# Patient Record
Sex: FEMALE | URBAN - METROPOLITAN AREA
[De-identification: names, ages, dates, MRNs, and addresses within clinical notes are randomized per-mention and may not be internally consistent; named-entity substitution may affect disease eponyms.]

---

## 2021-10-05 ENCOUNTER — APPOINTMENT (RX ONLY)
Dept: URBAN - METROPOLITAN AREA CLINIC 55 | Facility: CLINIC | Age: 61
Setting detail: DERMATOLOGY
End: 2021-10-05

## 2021-10-05 DIAGNOSIS — Z41.9 ENCOUNTER FOR PROCEDURE FOR PURPOSES OTHER THAN REMEDYING HEALTH STATE, UNSPECIFIED: ICD-10-CM

## 2021-10-05 PROCEDURE — ? DYSPORT

## 2021-10-05 NOTE — PROCEDURE: DYSPORT
Depressor Anguli Oris Units: 15
Mentalis Units: 0
Show Mentalis Units: No
Show Additional Area 2: Yes
Dilution (U/0.1 Cc): 10
Post-Care Instructions: Patient instructed to not lie down for 4 hours and limit physical activity for 24 hours. Follow up 7 days if needed.
Lot #: [id number]
Additional Area 1 Location: lip
Consent: Written consent obtained. Risks include but not limited to lid/brow ptosis, bruising, swelling, diplopia, temporary effect, incomplete chemical denervation.
Expiration Date (Month Year): 4/30/2022
Detail Level: Detailed
Price (Use Numbers Only, No Special Characters Or $): 216
Additional Area 1 Units: 5
Glabellar Complex Units: 50

## 2021-11-04 ENCOUNTER — APPOINTMENT (RX ONLY)
Dept: URBAN - METROPOLITAN AREA CLINIC 55 | Facility: CLINIC | Age: 61
Setting detail: DERMATOLOGY
End: 2021-11-04

## 2021-11-04 DIAGNOSIS — Z41.9 ENCOUNTER FOR PROCEDURE FOR PURPOSES OTHER THAN REMEDYING HEALTH STATE, UNSPECIFIED: ICD-10-CM

## 2021-11-04 PROCEDURE — ? COSMETIC FOLLOW-UP

## 2021-11-04 PROCEDURE — ? DYSPORT

## 2021-11-04 NOTE — PROCEDURE: DYSPORT
Levator Labii Superioris Units: 0
Detail Level: Detailed
Show Anterior Platysmal Band Units: Yes
Show Lcl Units: No
Additional Area 1 Location: lip
Dilution (U/0.1 Cc): 10
Consent: Written consent obtained. Risks include but not limited to lid/brow ptosis, bruising, swelling, diplopia, temporary effect, incomplete chemical denervation.
Expiration Date (Month Year): 4/30/2022
Additional Area 1 Units: 5
Forehead Units: 7.5
Post-Care Instructions: Patient instructed to not lie down for 4 hours and limit physical activity for 24 hours. Follow up 7 days if needed.
Lot #: [id number]
Price (Use Numbers Only, No Special Characters Or $): 216

## 2022-09-29 ENCOUNTER — APPOINTMENT (RX ONLY)
Dept: URBAN - METROPOLITAN AREA CLINIC 55 | Facility: CLINIC | Age: 62
Setting detail: DERMATOLOGY
End: 2022-09-29

## 2022-09-29 DIAGNOSIS — Z41.9 ENCOUNTER FOR PROCEDURE FOR PURPOSES OTHER THAN REMEDYING HEALTH STATE, UNSPECIFIED: ICD-10-CM

## 2022-09-29 PROCEDURE — ? DYSPORT

## 2022-09-29 NOTE — PROCEDURE: DYSPORT
Post-Care Instructions: Patient instructed to not lie down flat for 4 hours or rub the treatment area.
Show Masseter Units: Yes
Show Right And Left Periorbital Units: No
Right Periorbital Skin Units: 0
Dilution (U/0.1 Cc): 10
Glabellar Complex Units: 50
Additional Area 1 Location: upper cutaneous
Forehead Units: 20
Detail Level: Detailed
Show Inventory Tab: Show
Depressor Anguli Oris Units: 15
Consent obtained and risk reviewed.

## 2023-04-25 ENCOUNTER — APPOINTMENT (RX ONLY)
Dept: URBAN - METROPOLITAN AREA CLINIC 55 | Facility: CLINIC | Age: 63
Setting detail: DERMATOLOGY
End: 2023-04-25

## 2023-04-25 DIAGNOSIS — Z41.9 ENCOUNTER FOR PROCEDURE FOR PURPOSES OTHER THAN REMEDYING HEALTH STATE, UNSPECIFIED: ICD-10-CM

## 2023-04-25 PROCEDURE — ? DYSPORT

## 2023-04-25 NOTE — PROCEDURE: DYSPORT
Lateral Platysmal Bands Units: 0
Forehead Units: 20
Show Depressor Anguli Units: Yes
Show Right And Left Brow Units: No
Additional Area 1 Location: upper cutaneous
Detail Level: Detailed
Glabellar Complex Units: 50
Additional Area 1 Units: 10
Additional Area 2 Location: [facility]
Show Inventory Tab: Show
Consent obtained and risk reviewed.
Depressor Anguli Oris Units: 15
Post-Care Instructions: Patient instructed to not lie down flat for 4 hours or rub the treatment area.